# Patient Record
Sex: FEMALE | Race: WHITE
[De-identification: names, ages, dates, MRNs, and addresses within clinical notes are randomized per-mention and may not be internally consistent; named-entity substitution may affect disease eponyms.]

---

## 2017-09-28 ENCOUNTER — HOSPITAL ENCOUNTER (EMERGENCY)
Dept: HOSPITAL 53 - M ED | Age: 22
Discharge: HOME | End: 2017-09-28
Payer: OTHER GOVERNMENT

## 2017-09-28 VITALS — WEIGHT: 130.27 LBS | BODY MASS INDEX: 20.94 KG/M2 | HEIGHT: 66 IN

## 2017-09-28 VITALS — SYSTOLIC BLOOD PRESSURE: 111 MMHG | DIASTOLIC BLOOD PRESSURE: 67 MMHG

## 2017-09-28 DIAGNOSIS — N85.8: ICD-10-CM

## 2017-09-28 DIAGNOSIS — N75.1: Primary | ICD-10-CM

## 2017-09-28 LAB
ANION GAP SERPL CALC-SCNC: 6 MEQ/L (ref 8–16)
BASOPHILS # BLD AUTO: 0.1 10^3/UL (ref 0–0.2)
BASOPHILS NFR BLD AUTO: 0.4 % (ref 0–1)
BUN SERPL-MCNC: 16 MG/DL (ref 7–18)
CALCIUM SERPL-MCNC: 9.1 MG/DL (ref 8.5–10.1)
CHLORIDE SERPL-SCNC: 108 MEQ/L (ref 98–107)
CO2 SERPL-SCNC: 25 MEQ/L (ref 21–32)
CREAT SERPL-MCNC: 0.59 MG/DL (ref 0.55–1.02)
EOSINOPHIL # BLD AUTO: 0.1 10^3/UL (ref 0–0.5)
EOSINOPHIL NFR BLD AUTO: 0.3 % (ref 0–3)
ERYTHROCYTE [DISTWIDTH] IN BLOOD BY AUTOMATED COUNT: 12.1 % (ref 11.5–14.5)
GLUCOSE SERPL-MCNC: 96 MG/DL (ref 70–105)
IMM GRANULOCYTES NFR BLD: 0.4 % (ref 0–0)
LYMPHOCYTES # BLD AUTO: 2.2 10^3/UL (ref 1.5–6.5)
LYMPHOCYTES NFR BLD AUTO: 13.9 % (ref 24–44)
MCH RBC QN AUTO: 29.4 PG (ref 27–33)
MCHC RBC AUTO-ENTMCNC: 33 G/DL (ref 32–36.5)
MCV RBC AUTO: 89.1 FL (ref 80–96)
MONOCYTES # BLD AUTO: 1.5 10^3/UL (ref 0–0.8)
MONOCYTES NFR BLD AUTO: 9.2 % (ref 0–5)
NEUTROPHILS # BLD AUTO: 12.1 10^3/UL (ref 1.8–7.7)
NEUTROPHILS NFR BLD AUTO: 75.8 % (ref 36–66)
NRBC BLD AUTO-RTO: 0 % (ref 0–0)
PLATELET # BLD AUTO: 157 10^3/UL (ref 150–450)
POTASSIUM SERPL-SCNC: 4.3 MEQ/L (ref 3.5–5.1)
SODIUM SERPL-SCNC: 139 MEQ/L (ref 136–145)
WBC # BLD AUTO: 15.9 10^3/UL (ref 4–10)

## 2017-09-28 NOTE — REP
PELVIC ULTRASOUND:

 

Real-time sonographic evaluation of the pelvis performed utilizing transabdominal

and endovaginal technique.

 

The urinary bladder is empty.  The uterus measures 8.1 x 3.9 x 5.7 cm.

Endometrial thickness is 12 mm.  There appears to be a small amount of complex

fluid in the endometrial canal measuring 5 x 4 x 7 mm.  Ovaries appear normal in

size and echotexture, right ovary measuring 4.3 x 2.3 x 4.0 cm and left ovary 2.3

x 1.8 x 1.9 cm.  There is no adnexal mass or free fluid identified.  Blood flow

is seen in each ovary with duplex Doppler evaluation, with no torsion, RI right

ovary 0.27 and RI left ovary 0.62.  Scanning is also performed of the right labia

which demonstrates swelling.  There is a complex fluid collection in the right

labia measuring 3.4 x 2.0 x 2.0 cm.  This may represent an abscess.

 

IMPRESSION:

 

Small amount of complex fluid in the endometrial canal measuring 5 x 4 x 7 mm.

No adnexal mass or free fluid.  No torsion.  Complex fluid in the right labia may

represent an abscess.

 

 

Signed by

Rayshawn Farah MD 09/28/2017 07:51 P

## 2017-09-30 NOTE — ED PDOC
Post-Departure Follow-Up


dr drummond faxed formal report of pelvic us for fu mlg Lundborg-Gray,Maja MD Sep 30, 2017 08:54

## 2018-02-11 ENCOUNTER — HOSPITAL ENCOUNTER (OUTPATIENT)
Dept: HOSPITAL 53 - M LRY | Age: 23
End: 2018-02-11
Attending: NURSE PRACTITIONER
Payer: COMMERCIAL

## 2018-02-11 DIAGNOSIS — S99.921A: ICD-10-CM

## 2018-02-11 DIAGNOSIS — S99.911A: Primary | ICD-10-CM
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
## 2018-02-22 ENCOUNTER — HOSPITAL ENCOUNTER (OUTPATIENT)
Dept: HOSPITAL 53 - M LAB REF | Age: 23
End: 2018-02-22
Attending: OBSTETRICS & GYNECOLOGY
Payer: COMMERCIAL

## 2018-02-22 DIAGNOSIS — O36.80X0: Primary | ICD-10-CM

## 2018-02-22 LAB
HCG, SERUM QUANTITATIVE: 1992 MIU/ML
HEMATOCRIT: 40.9 % (ref 36–47)
HEMOGLOBIN: 13.7 G/DL (ref 12–16)
MEAN CORPUSCULAR HEMOGLOBIN: 29.1 PG (ref 27–33)
MEAN CORPUSCULAR HGB CONC: 33.5 G/DL (ref 32–36.5)
MEAN CORPUSCULAR VOLUME: 86.8 FL (ref 80–96)
NRBC BLD AUTO-RTO: 0 % (ref 0–0)
PLATELET COUNT, AUTOMATED: 205 10^3/UL (ref 150–450)
RED BLOOD COUNT: 4.71 10^6/UL (ref 4–5.4)
RED CELL DISTRIBUTION WIDTH: 11.9 % (ref 11.5–14.5)
WHITE BLOOD COUNT: 7.8 10^3/UL (ref 4–10)

## 2018-02-23 LAB
HBSAG PRENATAL: NEGATIVE
HCV AB SER QL: 0.1 INDEX (ref ?–0.8)
HIV 1&2 SCREEN CENTAUR: NEGATIVE
RUBELLA IGG QUALITATIVE: (no result)
SYPHILIS: NONREACTIVE

## 2018-08-03 ENCOUNTER — HOSPITAL ENCOUNTER (OUTPATIENT)
Dept: HOSPITAL 53 - M SMT | Age: 23
End: 2018-08-03
Attending: ADVANCED PRACTICE MIDWIFE
Payer: COMMERCIAL

## 2018-08-03 DIAGNOSIS — Z3A.00: ICD-10-CM

## 2018-08-03 DIAGNOSIS — Z36.89: Primary | ICD-10-CM

## 2018-08-03 LAB
GLUCOSE CHALLENGE TEST 1 HOUR: 75 MG/DL (ref ?–140)
HCV AB SER QL: 0.1 INDEX (ref ?–0.8)

## 2018-08-03 PROCEDURE — 82950 GLUCOSE TEST: CPT

## 2018-08-10 ENCOUNTER — HOSPITAL ENCOUNTER (OUTPATIENT)
Dept: HOSPITAL 53 - M SMT | Age: 23
End: 2018-08-10
Attending: ADVANCED PRACTICE MIDWIFE
Payer: COMMERCIAL

## 2018-08-10 DIAGNOSIS — Z34.82: Primary | ICD-10-CM

## 2018-08-10 DIAGNOSIS — Z36.89: ICD-10-CM

## 2018-08-10 LAB
HEMATOCRIT: 34.6 % (ref 36–47)
HEMOGLOBIN: 11.4 G/DL (ref 12–15.5)
MEAN CORPUSCULAR HEMOGLOBIN: 30.7 PG (ref 27–33)
MEAN CORPUSCULAR HGB CONC: 32.9 G/DL (ref 32–36.5)
MEAN CORPUSCULAR VOLUME: 93.3 FL (ref 80–96)
NRBC BLD AUTO-RTO: 0 % (ref 0–0)
PLATELET COUNT, AUTOMATED: 154 10^3/UL (ref 150–450)
RED BLOOD COUNT: 3.71 10^6/UL (ref 4–5.4)
RED CELL DISTRIBUTION WIDTH: 12.9 % (ref 11.5–14.5)
WHITE BLOOD COUNT: 11.3 10^3/UL (ref 4–10)

## 2018-08-10 PROCEDURE — 85027 COMPLETE CBC AUTOMATED: CPT

## 2018-10-01 ENCOUNTER — HOSPITAL ENCOUNTER (OUTPATIENT)
Dept: HOSPITAL 53 - M LAB REF | Age: 23
End: 2018-10-01
Attending: ADVANCED PRACTICE MIDWIFE
Payer: COMMERCIAL

## 2018-10-01 DIAGNOSIS — Z34.03: Primary | ICD-10-CM

## 2018-10-22 ENCOUNTER — HOSPITAL ENCOUNTER (OUTPATIENT)
Dept: HOSPITAL 53 - M SMT | Age: 23
End: 2018-10-22
Attending: ADVANCED PRACTICE MIDWIFE
Payer: COMMERCIAL

## 2018-10-22 DIAGNOSIS — O16.3: Primary | ICD-10-CM

## 2018-10-22 LAB
ALT SERPL W P-5'-P-CCNC: 12 U/L (ref 12–78)
AST SERPL-CCNC: 17 U/L (ref 7–37)
BILIRUBIN,TOTAL: 0.2 MG/DL (ref 0.2–1)
CREATININE FOR GFR: 0.75 MG/DL (ref 0.55–1.3)
CREATININE,RANDOM URINE: 99.3 MG/DL
GFR SERPL CREATININE-BSD FRML MDRD: > 60 ML/MIN/{1.73_M2} (ref 60–?)
HEMATOCRIT: 37.7 % (ref 36–47)
HEMOGLOBIN: 12.1 G/DL (ref 12–15.5)
LDH SERPL L TO P-CCNC: 248 U/L (ref 84–246)
MEAN CORPUSCULAR HEMOGLOBIN: 29 PG (ref 27–33)
MEAN CORPUSCULAR HGB CONC: 32.1 G/DL (ref 32–36.5)
MEAN CORPUSCULAR VOLUME: 90.4 FL (ref 80–96)
NRBC BLD AUTO-RTO: 0 % (ref 0–0)
PLATELET COUNT, AUTOMATED: 122 10^3/UL (ref 150–450)
RED BLOOD COUNT: 4.17 10^6/UL (ref 4–5.4)
RED CELL DISTRIBUTION WIDTH: 12.4 % (ref 11.5–14.5)
TOTAL PROTEIN,RANDOM URINE: 65.5 MG/DL (ref 0–12)
URIC ACID: 5.4 MG/DL (ref 2.6–6)
WHITE BLOOD COUNT: 11.3 10^3/UL (ref 4–10)

## 2018-10-22 PROCEDURE — 84460 ALANINE AMINO (ALT) (SGPT): CPT

## 2018-10-23 ENCOUNTER — HOSPITAL ENCOUNTER (INPATIENT)
Dept: HOSPITAL 53 - M LDO | Age: 23
LOS: 2 days | Discharge: HOME | End: 2018-10-25
Admitting: OBSTETRICS & GYNECOLOGY
Payer: COMMERCIAL

## 2018-10-23 DIAGNOSIS — Z3A.39: ICD-10-CM

## 2018-10-23 LAB
ALT SERPL W P-5'-P-CCNC: 10 U/L (ref 12–78)
AST SERPL-CCNC: 16 U/L (ref 7–37)
BILIRUBIN,TOTAL: 0.2 MG/DL (ref 0.2–1)
CREATININE FOR GFR: 0.7 MG/DL (ref 0.55–1.3)
GFR SERPL CREATININE-BSD FRML MDRD: > 60 ML/MIN/{1.73_M2} (ref 60–?)
HEMATOCRIT: 33.3 % (ref 36–47)
HEMATOCRIT: 33.6 % (ref 36–47)
HEMOGLOBIN: 11.1 G/DL (ref 12–15.5)
HEMOGLOBIN: 11.1 G/DL (ref 12–15.5)
LDH SERPL L TO P-CCNC: 238 U/L (ref 84–246)
MEAN CORPUSCULAR HEMOGLOBIN: 29 PG (ref 27–33)
MEAN CORPUSCULAR HEMOGLOBIN: 29.2 PG (ref 27–33)
MEAN CORPUSCULAR HGB CONC: 33 G/DL (ref 32–36.5)
MEAN CORPUSCULAR HGB CONC: 33.3 G/DL (ref 32–36.5)
MEAN CORPUSCULAR VOLUME: 87.6 FL (ref 80–96)
MEAN CORPUSCULAR VOLUME: 87.7 FL (ref 80–96)
NRBC BLD AUTO-RTO: 0 % (ref 0–0)
NRBC BLD AUTO-RTO: 0 % (ref 0–0)
PLATELET COUNT, AUTOMATED: 110 10^3/UL (ref 150–450)
PLATELET COUNT, AUTOMATED: 112 10^3/UL (ref 150–450)
RED BLOOD COUNT: 3.8 10^6/UL (ref 4–5.4)
RED BLOOD COUNT: 3.83 10^6/UL (ref 4–5.4)
RED CELL DISTRIBUTION WIDTH: 12.4 % (ref 11.5–14.5)
RED CELL DISTRIBUTION WIDTH: 12.5 % (ref 11.5–14.5)
URIC ACID: 5.2 MG/DL (ref 2.6–6)
WHITE BLOOD COUNT: 15.8 10^3/UL (ref 4–10)
WHITE BLOOD COUNT: 16.9 10^3/UL (ref 4–10)

## 2018-10-23 PROCEDURE — 0HQ9XZZ REPAIR PERINEUM SKIN, EXTERNAL APPROACH: ICD-10-PCS

## 2018-10-23 RX ADMIN — ONDANSETRON 1 MG: 2 INJECTION INTRAMUSCULAR; INTRAVENOUS at 19:14

## 2018-10-23 RX ADMIN — SODIUM CHLORIDE, POTASSIUM CHLORIDE, SODIUM LACTATE AND CALCIUM CHLORIDE 1 MLS/HR: 600; 310; 30; 20 INJECTION, SOLUTION INTRAVENOUS at 13:15

## 2018-10-23 RX ADMIN — SODIUM CHLORIDE, POTASSIUM CHLORIDE, SODIUM LACTATE AND CALCIUM CHLORIDE 1 MLS/HR: 600; 310; 30; 20 INJECTION, SOLUTION INTRAVENOUS at 10:48

## 2018-10-23 RX ADMIN — SODIUM CHLORIDE, POTASSIUM CHLORIDE, SODIUM LACTATE AND CALCIUM CHLORIDE 1 MLS/HR: 600; 310; 30; 20 INJECTION, SOLUTION INTRAVENOUS at 18:32

## 2018-10-23 RX ADMIN — Medication 1 MLS/HR: at 10:53

## 2018-10-23 RX ADMIN — Medication 1 MLS/HR: at 14:53

## 2018-10-23 RX ADMIN — ACETAMINOPHEN 1 MG: 500 TABLET ORAL at 19:47

## 2018-10-23 RX ADMIN — SODIUM CHLORIDE, POTASSIUM CHLORIDE, SODIUM LACTATE AND CALCIUM CHLORIDE 1 MLS/HR: 600; 310; 30; 20 INJECTION, SOLUTION INTRAVENOUS at 22:49

## 2018-10-24 LAB — SYPHILIS: NONREACTIVE

## 2018-10-24 RX ADMIN — IBUPROFEN 1 MG: 800 TABLET, FILM COATED ORAL at 01:34

## 2018-10-24 RX ADMIN — ACETAMINOPHEN 1 MG: 500 TABLET ORAL at 14:30

## 2018-10-24 RX ADMIN — Medication 1 MLS/HR: at 01:21

## 2018-10-24 RX ADMIN — Medication 1 TAB: at 08:15

## 2018-10-24 RX ADMIN — IBUPROFEN 1 MG: 800 TABLET, FILM COATED ORAL at 20:36

## 2018-10-24 RX ADMIN — DIBUCAINE 1 DOSE: 1 OINTMENT TOPICAL at 20:37

## 2018-10-24 RX ADMIN — ACETAMINOPHEN 1 MG: 500 TABLET ORAL at 08:16

## 2018-10-25 RX ADMIN — IBUPROFEN 1 MG: 800 TABLET, FILM COATED ORAL at 05:49

## 2018-10-25 RX ADMIN — Medication 1 TAB: at 09:38
